# Patient Record
Sex: FEMALE | URBAN - METROPOLITAN AREA
[De-identification: names, ages, dates, MRNs, and addresses within clinical notes are randomized per-mention and may not be internally consistent; named-entity substitution may affect disease eponyms.]

---

## 2023-04-26 ENCOUNTER — APPOINTMENT (RX ONLY)
Dept: URBAN - METROPOLITAN AREA CLINIC 114 | Facility: CLINIC | Age: 28
Setting detail: DERMATOLOGY
End: 2023-04-26

## 2023-04-26 VITALS — WEIGHT: 123.46 LBS | HEIGHT: 64.96 IN

## 2023-04-26 DIAGNOSIS — D22 MELANOCYTIC NEVI: ICD-10-CM

## 2023-04-26 DIAGNOSIS — L91.0 HYPERTROPHIC SCAR: ICD-10-CM

## 2023-04-26 PROBLEM — D22.72 MELANOCYTIC NEVI OF LEFT LOWER LIMB, INCLUDING HIP: Status: ACTIVE | Noted: 2023-04-26

## 2023-04-26 PROCEDURE — ? INTRALESIONAL KENALOG

## 2023-04-26 PROCEDURE — 99203 OFFICE O/P NEW LOW 30 MIN: CPT | Mod: 25

## 2023-04-26 PROCEDURE — ? COUNSELING

## 2023-04-26 PROCEDURE — 11900 INJECT SKIN LESIONS </W 7: CPT

## 2023-04-26 ASSESSMENT — LOCATION DETAILED DESCRIPTION DERM: LOCATION DETAILED: LEFT MEDIAL PLANTAR MIDFOOT

## 2023-04-26 ASSESSMENT — LOCATION ZONE DERM: LOCATION ZONE: FEET

## 2023-04-26 ASSESSMENT — LOCATION SIMPLE DESCRIPTION DERM: LOCATION SIMPLE: LEFT PLANTAR SURFACE

## 2023-04-26 NOTE — PROCEDURE: INTRALESIONAL KENALOG
Lot # For Kenalog (Optional): [id number]
Medical Necessity Clause: This procedure was medically necessary because the lesions that were treated were:
How Many Mls Were Removed From The 10 Mg/Ml (5ml) Vial When Preparing The Injectable Solution?: 0
Administered By (Optional): Dr Chan
Show Inventory Tab: Hide
Detail Level: Detailed
Validate Note Data When Using Inventory: Yes
Ndc# For Kenalog Only: 34132?0293?[id number]
Kenalog Preparation: Kenalog
Include Z78.9 (Other Specified Conditions Influencing Health Status) As An Associated Diagnosis?: No
Expiration Date For Kenalog (Optional): 6/24
Total Volume (Ccs): 1
Consent: The risks of atrophy were reviewed with the patient.
Concentration Of Kenalog Solution Injected (Mg/Ml): 5.0

## 2023-04-26 NOTE — HPI: SECONDARY COMPLAINT
How Severe Is This Condition?: mild
Additional History: Patient had mole removed by another dermatologist about five months ago. She states it is painful, not feeling well and the color is returning.

## 2023-05-31 ENCOUNTER — APPOINTMENT (RX ONLY)
Dept: URBAN - METROPOLITAN AREA CLINIC 114 | Facility: CLINIC | Age: 28
Setting detail: DERMATOLOGY
End: 2023-05-31

## 2023-05-31 VITALS — WEIGHT: 123.46 LBS | HEIGHT: 64.96 IN

## 2023-05-31 DIAGNOSIS — D22 MELANOCYTIC NEVI: ICD-10-CM

## 2023-05-31 DIAGNOSIS — L91.0 HYPERTROPHIC SCAR: ICD-10-CM

## 2023-05-31 PROBLEM — D22.72 MELANOCYTIC NEVI OF LEFT LOWER LIMB, INCLUDING HIP: Status: ACTIVE | Noted: 2023-05-31

## 2023-05-31 PROCEDURE — 99212 OFFICE O/P EST SF 10 MIN: CPT | Mod: 25

## 2023-05-31 PROCEDURE — ? COUNSELING

## 2023-05-31 PROCEDURE — 11900 INJECT SKIN LESIONS </W 7: CPT

## 2023-05-31 PROCEDURE — ? INTRALESIONAL KENALOG

## 2023-05-31 ASSESSMENT — LOCATION ZONE DERM
LOCATION ZONE: ARM
LOCATION ZONE: FEET

## 2023-05-31 ASSESSMENT — LOCATION SIMPLE DESCRIPTION DERM
LOCATION SIMPLE: LEFT PLANTAR SURFACE
LOCATION SIMPLE: RIGHT POSTERIOR UPPER ARM

## 2023-05-31 ASSESSMENT — LOCATION DETAILED DESCRIPTION DERM
LOCATION DETAILED: LEFT MEDIAL PLANTAR MIDFOOT
LOCATION DETAILED: RIGHT PROXIMAL LATERAL POSTERIOR UPPER ARM

## 2023-05-31 NOTE — PROCEDURE: INTRALESIONAL KENALOG
Treatment Number (Optional): 2
How Many Mls Were Removed From The 40 Mg/Ml (1ml) Vial When Preparing The Injectable Solution?: 0
Validate Note Data When Using Inventory: Yes
Detail Level: Detailed
Lot # For Kenalog (Optional): [id number]
Show Inventory Tab: Hide
Consent: The risks of atrophy were reviewed with the patient.
Expiration Date For Kenalog (Optional): June 2024
Include Z78.9 (Other Specified Conditions Influencing Health Status) As An Associated Diagnosis?: No
Kenalog Preparation: Kenalog
Concentration Of Kenalog Solution Injected (Mg/Ml): 5.0
Administered By (Optional): Jeffery Boykin PA-C
Total Volume (Ccs): 0.6
Ndc# For Kenalog Only: 9842-4159-49
Medical Necessity Clause: This procedure was medically necessary because the lesions that were treated were:

## 2023-05-31 NOTE — PROCEDURE: MIPS QUALITY
Quality 130: Documentation Of Current Medications In The Medical Record: Current Medications Documented
Detail Level: Detailed
Quality 431: Preventive Care And Screening: Unhealthy Alcohol Use - Screening: Patient not identified as an unhealthy alcohol user when screened for unhealthy alcohol use using a systematic screening method
Quality 128: Preventive Care And Screening: Body Mass Index (Bmi) Screening And Follow-Up Plan: BMI is documented within normal parameters and no follow-up plan is required.
Quality 226: Preventive Care And Screening: Tobacco Use: Screening And Cessation Intervention: Patient screened for tobacco use and is an ex/non-smoker

## 2023-07-12 ENCOUNTER — APPOINTMENT (RX ONLY)
Dept: URBAN - METROPOLITAN AREA CLINIC 114 | Facility: CLINIC | Age: 28
Setting detail: DERMATOLOGY
End: 2023-07-12

## 2023-07-12 VITALS — WEIGHT: 120 LBS | HEIGHT: 64.96 IN

## 2023-07-12 DIAGNOSIS — L91.0 HYPERTROPHIC SCAR: ICD-10-CM

## 2023-07-12 PROCEDURE — ? INTRALESIONAL KENALOG

## 2023-07-12 PROCEDURE — ? COUNSELING

## 2023-07-12 PROCEDURE — 11900 INJECT SKIN LESIONS </W 7: CPT

## 2023-07-12 ASSESSMENT — LOCATION DETAILED DESCRIPTION DERM: LOCATION DETAILED: LEFT MEDIAL PLANTAR MIDFOOT

## 2023-07-12 ASSESSMENT — LOCATION ZONE DERM: LOCATION ZONE: FEET

## 2023-07-12 ASSESSMENT — LOCATION SIMPLE DESCRIPTION DERM: LOCATION SIMPLE: LEFT PLANTAR SURFACE

## 2023-07-12 NOTE — PROCEDURE: INTRALESIONAL KENALOG
Treatment Number (Optional): 1
How Many Mls Were Removed From The 40 Mg/Ml (1ml) Vial When Preparing The Injectable Solution?: 0
Validate Note Data When Using Inventory: Yes
Detail Level: Detailed
Show Inventory Tab: Hide
Consent: The risks of atrophy were reviewed with the patient.
Include Z78.9 (Other Specified Conditions Influencing Health Status) As An Associated Diagnosis?: No
Kenalog Preparation: Kenalog
Concentration Of Kenalog Solution Injected (Mg/Ml): 5.0
Administered By (Optional): dr. Chan
Total Volume (Ccs): 0.6
Medical Necessity Clause: This procedure was medically necessary because the lesions that were treated were:
Kenalog Type Of Vial: Multiple Dose

## 2023-07-12 NOTE — HPI: SKIN LESION
What Type Of Note Output Would You Prefer (Optional)?: Standard Output
[email or web address]
Is This A New Presentation, Or A Follow-Up?: Skin Lesion
Additional History: Keloid has decreased in size since last visit.

## 2023-08-02 ENCOUNTER — APPOINTMENT (RX ONLY)
Dept: URBAN - METROPOLITAN AREA CLINIC 114 | Facility: CLINIC | Age: 28
Setting detail: DERMATOLOGY
End: 2023-08-02

## 2023-08-02 VITALS — HEIGHT: 64.96 IN | WEIGHT: 123.46 LBS

## 2023-08-02 DIAGNOSIS — L21.8 OTHER SEBORRHEIC DERMATITIS: ICD-10-CM

## 2023-08-02 DIAGNOSIS — L91.0 HYPERTROPHIC SCAR: ICD-10-CM | Status: IMPROVED

## 2023-08-02 DIAGNOSIS — L65.0 TELOGEN EFFLUVIUM: ICD-10-CM

## 2023-08-02 PROCEDURE — ? COUNSELING

## 2023-08-02 PROCEDURE — ? TREATMENT REGIMEN

## 2023-08-02 PROCEDURE — 99214 OFFICE O/P EST MOD 30 MIN: CPT

## 2023-08-02 PROCEDURE — ? PRESCRIPTION

## 2023-08-02 RX ORDER — FLUOCINOLONE ACETONIDE 0.1 MG/ML
SOLUTION TOPICAL
Qty: 90 | Refills: 3 | Status: ERX | COMMUNITY
Start: 2023-08-02

## 2023-08-02 RX ADMIN — FLUOCINOLONE ACETONIDE: 0.1 SOLUTION TOPICAL at 00:00

## 2023-08-02 ASSESSMENT — LOCATION SIMPLE DESCRIPTION DERM
LOCATION SIMPLE: LEFT PLANTAR SURFACE
LOCATION SIMPLE: LEFT FOREHEAD
LOCATION SIMPLE: SUPERIOR FOREHEAD

## 2023-08-02 ASSESSMENT — LOCATION DETAILED DESCRIPTION DERM
LOCATION DETAILED: LEFT MEDIAL FOREHEAD
LOCATION DETAILED: LEFT MEDIAL PLANTAR MIDFOOT
LOCATION DETAILED: SUPERIOR MID FOREHEAD

## 2023-08-02 ASSESSMENT — LOCATION ZONE DERM
LOCATION ZONE: FACE
LOCATION ZONE: FEET

## 2023-08-02 ASSESSMENT — SCAR ASSESSEMENT OVERALL: SCAR ASSESSMENT: 1 (FLAT SCAR, NO ERYTHEMA)

## 2023-08-02 ASSESSMENT — SEVERITY ASSESSMENT: HOW SEVERE IS THIS PATIENT'S CONDITION?: MILD

## 2023-08-02 NOTE — PROCEDURE: TREATMENT REGIMEN
Initiate Treatment: Synalar solution
Detail Level: Zone
Plan: Start Synalar solution to scalp.
Plan: Recommended 20grams of protein and iron supplements. Understands it can take a year for hair to grow back.\\nPt declined thyroid panel, ferritin, and cmp labs today. Will get it through PCP.